# Patient Record
Sex: FEMALE | Race: WHITE | NOT HISPANIC OR LATINO | Employment: UNEMPLOYED | URBAN - NONMETROPOLITAN AREA
[De-identification: names, ages, dates, MRNs, and addresses within clinical notes are randomized per-mention and may not be internally consistent; named-entity substitution may affect disease eponyms.]

---

## 2020-11-27 ENCOUNTER — LAB REQUISITION (OUTPATIENT)
Dept: LAB | Facility: HOSPITAL | Age: 75
End: 2020-11-27

## 2020-11-27 DIAGNOSIS — Z00.00 ENCOUNTER FOR GENERAL ADULT MEDICAL EXAMINATION WITHOUT ABNORMAL FINDINGS: ICD-10-CM

## 2020-11-27 LAB — SARS-COV-2 RNA PNL SPEC NAA+PROBE: DETECTED

## 2020-11-27 PROCEDURE — 87635 SARS-COV-2 COVID-19 AMP PRB: CPT | Performed by: NURSE PRACTITIONER

## 2021-07-01 ENCOUNTER — TELEPHONE (OUTPATIENT)
Dept: NEUROSURGERY | Age: 76
End: 2021-07-01

## 2021-07-01 NOTE — TELEPHONE ENCOUNTER
Called patient to schedule new patient appt. Pt confirmed day and time of appt. New pt paperwork mailed to address on file. Pt confirmed address. Hemostasis: Electrocaut[employer]

## 2021-07-19 ENCOUNTER — OFFICE VISIT (OUTPATIENT)
Dept: NEUROSURGERY | Age: 76
End: 2021-07-19
Payer: MEDICARE

## 2021-07-19 VITALS
HEART RATE: 102 BPM | SYSTOLIC BLOOD PRESSURE: 130 MMHG | DIASTOLIC BLOOD PRESSURE: 82 MMHG | OXYGEN SATURATION: 97 % | TEMPERATURE: 96.9 F | WEIGHT: 166 LBS | HEIGHT: 68 IN | BODY MASS INDEX: 25.16 KG/M2

## 2021-07-19 DIAGNOSIS — R25.1 TREMOR: Primary | ICD-10-CM

## 2021-07-19 DIAGNOSIS — R25.1 TREMOR: ICD-10-CM

## 2021-07-19 LAB — TSH REFLEX FT4: 9.39 UIU/ML (ref 0.35–5.5)

## 2021-07-19 PROCEDURE — G8400 PT W/DXA NO RESULTS DOC: HCPCS | Performed by: NURSE PRACTITIONER

## 2021-07-19 PROCEDURE — 99204 OFFICE O/P NEW MOD 45 MIN: CPT | Performed by: NURSE PRACTITIONER

## 2021-07-19 PROCEDURE — 1090F PRES/ABSN URINE INCON ASSESS: CPT | Performed by: NURSE PRACTITIONER

## 2021-07-19 PROCEDURE — G8427 DOCREV CUR MEDS BY ELIG CLIN: HCPCS | Performed by: NURSE PRACTITIONER

## 2021-07-19 PROCEDURE — 1036F TOBACCO NON-USER: CPT | Performed by: NURSE PRACTITIONER

## 2021-07-19 PROCEDURE — 4040F PNEUMOC VAC/ADMIN/RCVD: CPT | Performed by: NURSE PRACTITIONER

## 2021-07-19 PROCEDURE — 3017F COLORECTAL CA SCREEN DOC REV: CPT | Performed by: NURSE PRACTITIONER

## 2021-07-19 PROCEDURE — G8417 CALC BMI ABV UP PARAM F/U: HCPCS | Performed by: NURSE PRACTITIONER

## 2021-07-19 PROCEDURE — 1123F ACP DISCUSS/DSCN MKR DOCD: CPT | Performed by: NURSE PRACTITIONER

## 2021-07-19 RX ORDER — POTASSIUM CHLORIDE 20 MEQ/1
20 TABLET, EXTENDED RELEASE ORAL EVERY MORNING
COMMUNITY
Start: 2021-06-30

## 2021-07-19 RX ORDER — LEVOTHYROXINE SODIUM 88 UG/1
88 TABLET ORAL
COMMUNITY

## 2021-07-19 RX ORDER — ALBUTEROL SULFATE 90 UG/1
AEROSOL, METERED RESPIRATORY (INHALATION)
COMMUNITY
Start: 2021-06-30

## 2021-07-19 RX ORDER — HYDROCODONE BITARTRATE AND ACETAMINOPHEN 7.5; 325 MG/1; MG/1
TABLET ORAL
COMMUNITY

## 2021-07-19 RX ORDER — ZOLPIDEM TARTRATE 10 MG/1
10 TABLET ORAL NIGHTLY
COMMUNITY
Start: 2021-06-30

## 2021-07-19 RX ORDER — DOXEPIN HYDROCHLORIDE 10 MG/1
10 CAPSULE ORAL DAILY
COMMUNITY
Start: 2021-06-30

## 2021-07-19 RX ORDER — PRIMIDONE 50 MG/1
50 TABLET ORAL NIGHTLY
Qty: 30 TABLET | Refills: 5 | Status: SHIPPED | OUTPATIENT
Start: 2021-07-19

## 2021-07-19 RX ORDER — IPRATROPIUM BROMIDE AND ALBUTEROL SULFATE 2.5; .5 MG/3ML; MG/3ML
SOLUTION RESPIRATORY (INHALATION)
COMMUNITY

## 2021-07-19 RX ORDER — DULOXETIN HYDROCHLORIDE 60 MG/1
60 CAPSULE, DELAYED RELEASE ORAL EVERY MORNING
COMMUNITY

## 2021-07-19 RX ORDER — FLUTICASONE PROPIONATE 110 UG/1
AEROSOL, METERED RESPIRATORY (INHALATION)
COMMUNITY
Start: 2021-06-30

## 2021-07-19 NOTE — PROGRESS NOTES
REVIEW OF SYSTEMS    Constitutional: []Fever []Sweats []Chills [] Recent Injury [x] Denies all unless marked  HEENT:[x]Headache  [] Head Injury [] Hearing Loss  [] Sore Throat  [] Ear Ache [x] Denies all unless marked  Spine:  [] Neck pain  [] Back pain  [] Sciaticia  [x] Denies all unless marked  Cardiovascular:[]Heart Disease []Palpitations [] Chest Pain   [x] Denies all unless marked  Pulmonary: [x]Shortness of Breath []Cough   [x] Denies all unless marked  Psychiatric/Behavioral:[] Depression [] Anxiety [x] Denies all unless marked  Gastrointestinal: []Nausea  []Vomiting  []Abdominal Pain  []Constipation  []Diarrhea  [x] Denies all unless marked  Genitourinary:   [] Frequency  [] Urgency  [] Dysuria [] Incontinence  [x] Denies all unless marked  Extremities: []Pain  []Swelling  [x] Denies all unless marked  Musculoskeletal: [] Myalgias  [] Joint Pain  [] Arthritis [] Muscle Cramps [] Muscle Twitches  [x] Denies all unless marked  Sleep: []Insomnia[]Snoring []Restless Legs  []Sleep Apnea  []Daytime Sleepiness  [x] Denies all unless marked  Skin:[] Rash [] Color Change [x] Denies all unless marked   Neurological:[]Visual Disturbance [] Memory Loss []Loss of Balance []Slurred Speech []Weakness []Seizures  [] Dizziness [x] Denies all unless marked

## 2021-07-19 NOTE — PROGRESS NOTES
[facility] Neurology Office Note      Patient:   Mendel Palin  MR#:    <[id number]>  Account Number:                         YOB: 1945  Date of Evaluation:  2021  Time of Note:                          3:49 PM  Primary/Referring Physician:  Connie Moran MD   Consulting Physician:  Peggy Perez, DNP, APRN    NEW PATIENT CONSULTATION    Chief Complaint   Patient presents with    New Patient     c/o shaking in hands mostly but has been noted in legs at times.  is affected     Tremors     HISTORY OF PRESENT ILLNESS    Mendel Palin is a 77y.o. year old female here for evaluation of tremors. Has noted tremors beginning over the past few months now. She notes bilateral hand tremor, worse over the right hand. Tremors worsen with intention, posture, fine motor tasks. Anxiety worsens the tremor. No clear resting tremor. Denies chin/lip tremor. Denies vocal tremor. Denies head titubation. She has noted gait changes, described more as imbalanced, using a walker. No clear shuffling. No clear rigidity or bradykinesia. Feels like tremor is interfering with ADLs at times. No tremor inducing medications. No stroke history. No clear family history of tremor, father  young. She does have COPD, on oxygen nightly and prn during the day.      Past Medical History:   Diagnosis Date    Breast cancer ([address]     COPD (chronic obstructive pulmonary disease) [facility] [address]        Past Surgical History:   Procedure Laterality Date    COLONOSCOPY      MASTECTOMY      L breast       Family History   Problem Relation Age of Onset    Ovarian Cancer Mother     Lung Cancer Father     Brain Cancer Father     Heart Attack Brother     Brain Cancer Son     Mental Retardation Son        Social History     Socioeconomic History    Marital status: Unknown     Spouse name: Not on file    Number of children: Not on file    Years of education: Not on file    Highest education level: Not on file   Occupational History    Not on file   Tobacco Use    Smoking status: Former Smoker     Types: Cigarettes     Quit date: 2012     Years since quittin.8    Smokeless tobacco: Never Used   Vaping Use    Vaping Use: Never used   Substance and Sexual Activity    Alcohol use: Not Currently    Drug use: Not Currently    Sexual activity: Not on file   Other Topics Concern    Not on file   Social History Narrative    Not on file     Social Determinants of Health     Financial Resource Strain:     Difficulty of Paying Living Expenses:    Food Insecurity:     Worried About Running Out of Food in the Last Year:     [address] in the Last Year:    Transportation Needs:     Lack of Transportation (Medical):  Lack of Transportation (Non-Medical):    Physical Activity:     Days of Exercise per Week:     Minutes of Exercise per Session:    Stress:     Feeling of Stress :    Social Connections:     Frequency of Communication with Friends and Family:     Frequency of Social Gatherings with Friends and Family:     Attends Denominational Services:     Active Member of Clubs or Organizations:     Attends Club or Organization Meetings:     Marital Status:    Intimate Partner Violence:     Fear of Current or Ex-Partner:     Emotionally Abused:     Physically Abused:     Sexually Abused:        Current Outpatient Medications   Medication Sig Dispense Refill    albuterol sulfate  (90 Base) MCG/ACT inhaler INHALE 2 PUFF(S) EVERY 4 HOURS BY INHALATION ROUTE.      doxepin (SINEQUAN) 10 MG capsule Take 10 mg by mouth daily      DULoxetine (CYMBALTA) 60 MG extended release capsule Take 60 mg by mouth every morning      fluticasone (FLOVENT HFA) 110 MCG/ACT inhaler Inhale 1 puff twice a day by inhalation route.       HYDROcodone-acetaminophen (NORCO) 7.5-325 MG per tablet TAKE 1 TABLET BY MOUTH EVERY SIX HOURS      ipratropium-albuterol (DUONEB) 0.5-2.5 (3) MG/3ML SOLN nebulizer solution INHALE 1 VIAL (3ML) PER NEBULIZER FOUR TIMES DAILY      levothyroxine (SYNTHROID) 88 MCG tablet Take 88 mcg by mouth Once in dialysis      potassium chloride (KLOR-CON M) 20 MEQ extended release tablet Take 20 mEq by mouth every morning      Pseudoephedrine-Naproxen Na (ALEVE-D SINUS & COLD PO) Take 81 mg by mouth daily      zolpidem (AMBIEN) 10 MG tablet Take 10 mg by mouth nightly.  primidone (MYSOLINE) 50 MG tablet Take 1 tablet by mouth nightly 30 tablet 5     No current facility-administered medications for this visit. Allergies   Allergen Reactions    Latex Other (See Comments)    Cephalexin Other (See Comments)    Citalopram Nausea Only    Sulfamethoxazole-Trimethoprim Other (See Comments)     REVIEW OF SYSTEMS  Constitutional: []? Fever []? Sweats []? Chills []? Recent Injury [x]? Denies all unless marked  HEENT:[x]? Headache  []? Head Injury []? Hearing Loss  []? Sore Throat  []? Ear Ache [x]? Denies all unless marked  Spine:  []? Neck pain  []? Back pain  []? Sciaticia  [x]? Denies all unless marked  Cardiovascular:[]? Heart Disease []? Palpitations []? Chest Pain   [x]? Denies all unless marked  Pulmonary: [x]? Shortness of Breath []? Cough   [x]? Denies all unless marked  Psychiatric/Behavioral:[]? Depression []? Anxiety [x]? Denies all unless marked  Gastrointestinal: []? Nausea  []? Vomiting  []? Abdominal Pain  []? Constipation  []? Diarrhea  [x]? Denies all unless marked  Genitourinary:   []? Frequency  []? Urgency  []? Dysuria []? Incontinence  [x]? Denies all unless marked  Extremities: []? Pain  []? Swelling  [x]? Denies all unless marked  Musculoskeletal: []? Myalgias  []? Joint Pain  []? Arthritis []? Muscle Cramps []? Muscle Twitches  [x]? Denies all unless marked  Sleep: []? Insomnia[]? Snoring []? Restless Legs  []? Sleep Apnea  []? Daytime Sleepiness  [x]? Denies all unless marked  Skin:[]? Rash []? Color Change [x]? Denies all unless marked   Neurological:[]? Visual Disturbance []? Memory Loss []? Loss of Balance []?Slurred Speech []? Weakness []? Seizures  []? Dizziness [x]? Denies all unless marked    The MA has completed the ROS with the patient. I have reviewed it in its' entirety with the patient and agree with the documentation. PHYSICAL EXAM  /82   Pulse 102   Temp 96.9 °F (36.1 °C)   Ht 5' 8\" (1.727 m)   Wt 166 lb (75.3 kg)   SpO2 97%   Breastfeeding No   BMI 25.24 kg/m²       Constitutional  No acute distress    HEENT- Conjunctiva normal.  No scars, masses, or lesions over external nose or ears, no neck masses noted, no jugular vein distension, no bruit  Cardiac- Regular rate and rhythm  Pulmonary- Good expansion, normal effort without use of accessory muscles  Musculoskeletal  No significant wasting of muscles noted, no bony deformities  Extremities - No clubbing, cyanosis or edema  Skin  Warm, dry, and intact. No rash, erythema, or pallor  Psychiatric  Mood, affect, and behavior appear normal      NEUROLOGICAL EXAM     Mental status   [x] Awake, alert, oriented   [x]Affect attention and concentration appear appropriate  [x]Recent and remote memory appears unremarkable  [x]Speech normal without dysarthria or aphasia, comprehension and repetition intact.    COMMENTS:    Cranial Nerves [x]No VF deficit to confrontation,  no papilledema on fundoscopic exam.  [x]PERRLA, EOMI, no nystagmus, conjugate eye movements, no ptosis  [x]Face symmetric  [x]Facial sensation intact  [x]Tongue midline no atrophy or fasciculations present  [x]Palate midline, hearing to finger rub normal bilaterally  [x]Shoulder shrug and SCM testing normal bilaterally  COMMENTS:   Motor   [x]5/5 strength x 4 extremities  [x]Normal bulk and tone  []No tremor present  []No rigidity or bradykinesia noted  COMMENTS: bilateral intention/postural tremor; questionable rigidity over the left    Sensory  [x]Sensation intact to light touch, pin prick, vibration, and proprioception BLE  [x]Sensation intact to light touch, pin prick, vibration, and proprioception BUE  COMMENTS:   Coordination [x]FTN normal bilaterally   [x]HTS normal bilaterally  [x]ABEBA normal bilaterally. COMMENTS:   Reflexes  [x]Symmetric and non-pathological  [x]Toes down going bilaterally  [x]No clonus present  COMMENTS:   Gait                  []Normal steady gait    []Ataxic    []Spastic     []Magnetic     []Shuffling  COMMENTS: cautious, with walker        LABS RECORD AND IMAGING REVIEW (As below and per HPI)    No results found for: MAATTBFY17  No results found for: WBC, HGB, HCT, MCV, PLT  No results found for: NA, K, CL, CO2, BUN, CREATININE, GLUCOSE, CALCIUM, PROT, LABALBU, BILITOT, ALKPHOS, AST, ALT, LABGLOM, GFRAA, AGRATIO, GLOB  No results found for: CHOL, TRIG, HDL, LDLCALC  No results found for: TSH, T4FREE  No results found for: CRP, SEDRATE     Reviewed referral records     ASSESSMENT:    Susan Sevilla is a 77y.o. year old female here for evaluation of tremor. Exam is most consistent with essential tremor today although she does have subtle rigidity over the left arm. However no other clear parkinsonisms on exam. Discussed MRI brain but patient hesitant to proceed with this, felt reasonable at this time. Will plan to add Primidone today, titrate as needed. Propranolol contraindicated give underlying COPD history. ICD-10-CM    1. Tremor  R25.1 TSH WITH REFLEX TO FT4       PLAN:  1. TSH/T4 today   2. Primidone 50mg nightly. Discussed side effects with patient. Discussed limiting caffeine with essential tremor. 3. Hold off on imaging for now. 4. Return in about 3 months (around 10/19/2021) for follow up, sooner if worsening.     Yelitza Turpin DNP, APRN

## 2021-07-21 ENCOUNTER — TELEPHONE (OUTPATIENT)
Dept: NEUROSURGERY | Age: 76
End: 2021-07-21

## 2021-07-21 NOTE — TELEPHONE ENCOUNTER
Called all numbers noted in chart. All numbers removed from demographics due to being the wrong number. Unable to reach patient at this time.

## 2021-07-21 NOTE — TELEPHONE ENCOUNTER
----- Message from CARA Diaz sent at 7/19/2021  4:02 PM CDT -----  Thyroid testing abnormal, f/u with PCP regarding this. Fax labs to PCP, T4 is still pending.

## 2021-09-07 ENCOUNTER — TELEPHONE (OUTPATIENT)
Dept: NEUROSURGERY | Age: 76
End: 2021-09-07

## 2021-09-07 NOTE — TELEPHONE ENCOUNTER
Called patient explained we saw her for a tremor and there no mention as to why she would need a leg massager explained she would need to follow up with pcp for this.

## 2021-09-07 NOTE — TELEPHONE ENCOUNTER
Lorenzo Cosby requests that  Nurse  return their call. The best time to reach her is Anytime. [facility] or phone [facility] . Thank you.

## 2021-09-27 NOTE — TELEPHONE ENCOUNTER
Received call from insurance spoke with [place]. He is requesting signed orders for leg massager. I explained we have spoke with the pt and informed her we see her for tremors so there would be no reason we would prescribe this that she would need to speak with PCP for orders. Pt was aware. Insurance voiced understanding.

## 2022-04-14 ENCOUNTER — HOSPITAL ENCOUNTER (OUTPATIENT)
Dept: GENERAL RADIOLOGY | Facility: HOSPITAL | Age: 77
Discharge: HOME | End: 2022-04-14
Admitting: INTERNAL MEDICINE

## 2022-04-14 ENCOUNTER — OFFICE VISIT (OUTPATIENT)
Dept: PULMONOLOGY | Facility: CLINIC | Age: 77
End: 2022-04-14

## 2022-04-14 VITALS
HEIGHT: 68 IN | BODY MASS INDEX: 25.76 KG/M2 | OXYGEN SATURATION: 98 % | WEIGHT: 170 LBS | DIASTOLIC BLOOD PRESSURE: 82 MMHG | HEART RATE: 100 BPM | SYSTOLIC BLOOD PRESSURE: 136 MMHG

## 2022-04-14 DIAGNOSIS — J96.11 CHRONIC RESPIRATORY FAILURE WITH HYPOXIA: ICD-10-CM

## 2022-04-14 DIAGNOSIS — Z86.16 HISTORY OF 2019 NOVEL CORONAVIRUS DISEASE (COVID-19): ICD-10-CM

## 2022-04-14 DIAGNOSIS — R05.9 COUGH: ICD-10-CM

## 2022-04-14 DIAGNOSIS — Z86.16 HISTORY OF 2019 NOVEL CORONAVIRUS DISEASE (COVID-19): Primary | ICD-10-CM

## 2022-04-14 PROCEDURE — 94664 DEMO&/EVAL PT USE INHALER: CPT | Performed by: INTERNAL MEDICINE

## 2022-04-14 PROCEDURE — 71046 X-RAY EXAM CHEST 2 VIEWS: CPT

## 2022-04-14 PROCEDURE — 99204 OFFICE O/P NEW MOD 45 MIN: CPT | Performed by: INTERNAL MEDICINE

## 2022-04-14 RX ORDER — FLUTICASONE PROPIONATE 50 MCG
SPRAY, SUSPENSION (ML) NASAL
COMMUNITY

## 2022-04-14 RX ORDER — DEXAMETHASONE 4 MG/1
TABLET ORAL EVERY 12 HOURS SCHEDULED
COMMUNITY
End: 2022-05-24

## 2022-04-14 RX ORDER — POTASSIUM CHLORIDE 20 MEQ/1
TABLET, EXTENDED RELEASE ORAL
COMMUNITY

## 2022-04-14 RX ORDER — IPRATROPIUM BROMIDE AND ALBUTEROL SULFATE 2.5; .5 MG/3ML; MG/3ML
SOLUTION RESPIRATORY (INHALATION)
COMMUNITY

## 2022-04-14 RX ORDER — ZOLPIDEM TARTRATE 10 MG/1
TABLET ORAL
COMMUNITY
End: 2022-12-13

## 2022-04-14 RX ORDER — ALBUTEROL SULFATE 90 UG/1
AEROSOL, METERED RESPIRATORY (INHALATION)
COMMUNITY

## 2022-04-14 RX ORDER — HYDROCODONE BITARTRATE AND ACETAMINOPHEN 7.5; 325 MG/1; MG/1
TABLET ORAL
COMMUNITY

## 2022-04-14 RX ORDER — LEVOTHYROXINE SODIUM 88 UG/1
TABLET ORAL
COMMUNITY

## 2022-04-14 NOTE — PROGRESS NOTES
Background:  pt w hx covid 2/2021, chronic cough   Chief Complaint  COPD    Subjective    History of Present Illness       Gisel Combs presents to [facility].  I am asked to see her for cough and concerns for copd.  She reports copd for several years.  She uses oxygen around the clock.  She had Covid-like problem without symptoms, over a year ago and prior history of breast cancer.  More recently she has had tremors.  Records from [facility] are reviewed indicating tx with primidone for essential tremor.  She uses oxygen at night.  Pt reports moderate intermittent dyspnea on exertion in chest associated with wheeze and alleviated by albuterol inhaler and duoneb nebs and oxygen.     [facility] history review:  Past Medical History:   Diagnosis Date   • Breast cancer (HCC)   • COPD (chronic obstructive pulmonary disease) (HCC)     Past Surgical History:   Procedure Laterality Date   • COLONOSCOPY   • MASTECTOMY 2003   L breast     Family History   Problem Relation Age of Onset   • Ovarian Cancer Mother   • Lung Cancer Father   • Brain Cancer Father   • Heart Attack Brother   • Brain Tumor  Son        has a past medical history of COPD (chronic obstructive pulmonary disease) (HCC), Hypothyroidism, Pulmonary embolism (HCC), and Sleep apnea, obstructive.   has a past surgical history that includes Hysterectomy and Vaginal prolapse repair.  family history includes Cancer in her child, father, and mother.   reports that she quit smoking about 19 years ago. She has a 20.00 pack-year smoking history. She has never used smokeless tobacco. She reports previous alcohol use. She reports previous drug use.  Allergies   Allergen Reactions   • Adhesive Tape Other (See Comments)   • Cephalexin Other (See Comments)   • Citalopram Nausea Only   • Sulfamethoxazole-Trimethoprim Other (See Comments)   • Latex Rash       Objective     Vital Signs:   /82   Pulse 100   Ht  "172.7 cm (68\")   Wt 77.1 kg (170 lb)   SpO2 98% Comment: 4L  BMI 25.85 kg/m²   Physical Exam  Constitutional:       General: She is not in acute distress.     Appearance: She is well-developed. She is not ill-appearing or toxic-appearing.   HENT:      Head: Atraumatic.   Eyes:      General: No scleral icterus.     Conjunctiva/sclera: Conjunctivae normal.   Cardiovascular:      Rate and Rhythm: Normal rate and regular rhythm.      Heart sounds: S1 normal and S2 normal.   Pulmonary:      Effort: Pulmonary effort is normal.      Breath sounds: Normal breath sounds. No wheezing.   Abdominal:      General: There is no distension.   Musculoskeletal:         General: No deformity.      Cervical back: Neck supple.   Skin:     Coloration: Skin is not pale.      Findings: No rash.   Neurological:      Mental Status: She is alert.        Result Review  Data Reviewed:{ Labs  Result Review  Imaging  Med Tab  Media :23}     X-RAY CHEST PA AND LATERAL (07/14/2014 15:36)  There is evidence of previous left mastectomy and left axillary lymph  node dissection. There is a calcified nodule in the left upper lobe. The  lungs are mildly hyperinflated. No focal infiltrate is seen. Heart size  is normal. There is mild ectasia of the thoracic aorta. The bony thorax  is unremarkable.   My interpretation of radiograph: post surgical changes from breast surgery on left, calc granuloma, mild diaphragm flattening              Assessment and Plan {CC Problem List  Visit Diagnosis  ROS  Review (Popup)  Health Maintenance  Quality  BestPractice  Medications  SmartSets  SnapShot Encounters  Media :23}   Diagnoses and all orders for this visit:    1. History of 2019 novel coronavirus disease (COVID-19) (Primary)  -     XR Chest 2 View; Future    2. Cough    3. Chronic respiratory failure with hypoxia (HCC)  -     Alpha - 1 - Antitrypsin; Future    will check PFT  will check cxr  Will check aat screen  We demonstrated proper " technique for ellipta device use  Continue oxygen  Continue albuterol and nebs prn    Follow Up {Instructions Charge Capture  Follow-up Communications :23}   Return in about 4 weeks (around 5/12/2022) for full PFT.  Patient was given instructions and counseling regarding her condition or for health maintenance advice. Please see specific information pulled into the AVS if appropriate.    Electronically signed by Matthew Mueller MD, 4/14/2022, 15:08 CDT

## 2022-04-15 ENCOUNTER — TELEPHONE (OUTPATIENT)
Dept: PULMONOLOGY | Facility: CLINIC | Age: 77
End: 2022-04-15

## 2022-04-15 NOTE — PROGRESS NOTES
Please call the patient regarding her abnormal result.  No acute abnormalities.  Compression fractures are there, new since previous xray in 2014

## 2022-04-15 NOTE — TELEPHONE ENCOUNTER
She is wanting to know if she can take her nebulizer treatments while trialing the breztri and trelegy.   She says she has the ipratropium and albuterol.

## 2022-05-23 NOTE — PROGRESS NOTES
"Background:  pt w hx ?covid 2/2021, chronic cough.  AAT MM   Chief Complaint  hx covid-19 and Shortness of Breath    Subjective    History of Present Illness       Gisel Combs presents to [facility].  She is feeling fatigued today.  She had a busy day yesterday.  She has back fractures in her back giving her a lot of pain.  She had a somewhat dubious dx of Covid last year but she did not feel she had it.  No fever no increased sputum.  She has had some days of just staying in bed.  I gave her samples of breztri and trelegy, and breztri helped significantly and trelegy helped somewhat.     Tobacco Use: Medium Risk   • Smoking Tobacco Use: Former Smoker   • Smokeless Tobacco Use: Never Used      Objective     Vital Signs:   /58   Pulse 91   Ht 162.6 cm (64\")   Wt 76 kg (167 lb 9.6 oz)   SpO2 90% Comment: dropped to 71 56 84 put on 4 lt cont up to97  BMI 28.77 kg/m²   Physical Exam  Constitutional:       General: She is not in acute distress.     Appearance: She is well-developed. She is not ill-appearing or toxic-appearing.   HENT:      Head: Atraumatic.   Eyes:      General: No scleral icterus.     Conjunctiva/sclera: Conjunctivae normal.   Cardiovascular:      Rate and Rhythm: Normal rate and regular rhythm.      Heart sounds: S1 normal and S2 normal.   Pulmonary:      Effort: Pulmonary effort is normal.      Breath sounds: Normal breath sounds.   Abdominal:      General: There is no distension.   Musculoskeletal:         General: No deformity.      Cervical back: Neck supple.   Skin:     Coloration: Skin is not pale.      Findings: No rash.   Neurological:      Mental Status: She is alert.        Result Review  Data Reviewed:{ Labs  Result Review  Imaging  Media :23}       PFT Values        Some values may be hidden. Unless noted otherwise, only the newest values recorded on each date are displayed.         Old Values PFT Results 5/24/22   No " data to display.      Pre Drug PFT Results 5/24/22   FVC 65   FEV1 53   FEF 25-75% 33   FEV1/FVC 63      Post Drug PFT Results 5/24/22   No data to display.      Other Tests PFT Results 5/24/22   TLC 89      DLCO 64   D/VAsb 86                      Assessment and Plan  {CC Problem List  Visit Diagnosis  ROS  Review (Popup)  Health Maintenance  Quality  BestPractice  Medications  SmartSets  SnapShot Encounters  Media :23}   Diagnoses and all orders for this visit:    1. Shortness of breath (Primary)  -     Pulmonary Function Test    2. Encounter for preoperative screening laboratory testing for COVID-19 virus  -     COVID PRE-OP / PRE-PROCEDURE SCREENING ORDER (NO ISOLATION) - Swab, Nasal Cavity; Future    3. Cough    4. Chronic respiratory failure with hypoxia ([facility])  -     Fluticasone-Umeclidin-Vilant (Trelegy Ellipta) 100-62.5-25 MCG/INH inhaler; Inhale 1 puff Daily for 30 days.  Dispense: 1 each; Refill: 11    5. History of 2019 novel coronavirus disease (COVID-19)    6. Stage 2 moderate COPD by GOLD classification ([facility])    Other orders  -     SCANNED - LABS    We reviewed PFT showing moderate obstruction. It does not appear she is having any exacerbation.  Insurance formulary queried and it appears trelegy is preferred with a $10 cost.  Will rx this one.  Can go back and try to prior authorize breztri if trelegy fails.  Continue oxygen for chronic resp failure.  We rechecked prior to completion of visit and sat again >90 on o2. Cough is not worse, likely related to copd.  Follow Up {Instructions Charge Capture  Follow-up Communications :23}   Return in about 6 months (around 11/24/2022).  Patient was given instructions and counseling regarding her condition or for health maintenance advice. Please see specific information pulled into the AVS if appropriate.    Electronically signed by Matthew Mueller MD, 5/24/2022, 17:37 CDT

## 2022-05-24 ENCOUNTER — OFFICE VISIT (OUTPATIENT)
Dept: PULMONOLOGY | Facility: CLINIC | Age: 77
End: 2022-05-24

## 2022-05-24 VITALS
HEIGHT: 64 IN | BODY MASS INDEX: 28.61 KG/M2 | OXYGEN SATURATION: 90 % | WEIGHT: 167.6 LBS | SYSTOLIC BLOOD PRESSURE: 100 MMHG | HEART RATE: 91 BPM | DIASTOLIC BLOOD PRESSURE: 58 MMHG

## 2022-05-24 DIAGNOSIS — Z20.822 ENCOUNTER FOR PREOPERATIVE SCREENING LABORATORY TESTING FOR COVID-19 VIRUS: ICD-10-CM

## 2022-05-24 DIAGNOSIS — Z01.812 ENCOUNTER FOR PREOPERATIVE SCREENING LABORATORY TESTING FOR COVID-19 VIRUS: ICD-10-CM

## 2022-05-24 DIAGNOSIS — J96.11 CHRONIC RESPIRATORY FAILURE WITH HYPOXIA: ICD-10-CM

## 2022-05-24 DIAGNOSIS — R06.02 SHORTNESS OF BREATH: Primary | ICD-10-CM

## 2022-05-24 DIAGNOSIS — Z86.16 HISTORY OF 2019 NOVEL CORONAVIRUS DISEASE (COVID-19): ICD-10-CM

## 2022-05-24 DIAGNOSIS — J44.9 STAGE 2 MODERATE COPD BY GOLD CLASSIFICATION: ICD-10-CM

## 2022-05-24 DIAGNOSIS — R05.9 COUGH: ICD-10-CM

## 2022-05-24 PROCEDURE — 94729 DIFFUSING CAPACITY: CPT | Performed by: INTERNAL MEDICINE

## 2022-05-24 PROCEDURE — 99214 OFFICE O/P EST MOD 30 MIN: CPT | Performed by: INTERNAL MEDICINE

## 2022-05-24 PROCEDURE — 94010 BREATHING CAPACITY TEST: CPT | Performed by: INTERNAL MEDICINE

## 2022-05-24 PROCEDURE — 94727 GAS DIL/WSHOT DETER LNG VOL: CPT | Performed by: INTERNAL MEDICINE

## 2022-05-24 RX ORDER — ACETAMINOPHEN AND CODEINE PHOSPHATE 300; 60 MG/1; MG/1
TABLET ORAL
COMMUNITY

## 2022-05-24 NOTE — PROCEDURES
Pulmonary Function Test  Performed by: Laurie Jolley, RRT  Authorized by: Matthew Mueller MD      Pre Drug % Predicted    FVC: 65%   FEV1: 53%   FEF 25-75%: 33%   FEV1/FVC: 63%   T%   RV: 116%   DLCO: 64%   D/VAsb: 86%    Interpretation   Spirometry   Spirometry shows moderate obstruction. There is reduced midflow suggesting small airway/airflow obstruction.   Review of FVL curve   Patient's effort is normal.   Lung Volume Measurements  Measurements show normal results.   Diffusion Capacity  The patient's diffusion capacity is normal.  Diffusion capacity is normal when corrected for alveolar volume.

## 2022-12-13 ENCOUNTER — OFFICE VISIT (OUTPATIENT)
Dept: PULMONOLOGY | Facility: CLINIC | Age: 77
End: 2022-12-13

## 2022-12-13 VITALS
HEART RATE: 116 BPM | SYSTOLIC BLOOD PRESSURE: 142 MMHG | WEIGHT: 175 LBS | DIASTOLIC BLOOD PRESSURE: 96 MMHG | HEIGHT: 64 IN | BODY MASS INDEX: 29.88 KG/M2 | OXYGEN SATURATION: 96 %

## 2022-12-13 DIAGNOSIS — J44.9 STAGE 2 MODERATE COPD BY GOLD CLASSIFICATION: Primary | ICD-10-CM

## 2022-12-13 DIAGNOSIS — Z86.16 HISTORY OF 2019 NOVEL CORONAVIRUS DISEASE (COVID-19): ICD-10-CM

## 2022-12-13 DIAGNOSIS — R05.3 CHRONIC COUGH: ICD-10-CM

## 2022-12-13 DIAGNOSIS — J96.11 CHRONIC RESPIRATORY FAILURE WITH HYPOXIA: ICD-10-CM

## 2022-12-13 PROCEDURE — 99214 OFFICE O/P EST MOD 30 MIN: CPT | Performed by: INTERNAL MEDICINE

## 2022-12-13 RX ORDER — SERTRALINE HYDROCHLORIDE 100 MG/1
100 TABLET, FILM COATED ORAL DAILY
COMMUNITY

## 2022-12-13 RX ORDER — TEMAZEPAM 30 MG/1
CAPSULE ORAL NIGHTLY
COMMUNITY
Start: 2022-12-12

## 2022-12-13 NOTE — PROGRESS NOTES
"Background:  pt w chronic cough.  AAT MM   Chief Complaint  Shortness of Breath    Subjective    History of Present Illness       Gisel Combs presents to [facility] PULMONARY & CRITICAL CARE MEDICINE.  History of Present Illness  We had tried trelegy at the last visit.  She continues on oxygen concentrator.  She has not had any resp crises or panic attacks lately.  She misplaced the trelegy.  She is trying it again.  She has been using the nebulizer 4 times every day.      Tobacco Use: Medium Risk   • Smoking Tobacco Use: Former   • Smokeless Tobacco Use: Never   • Passive Exposure: Not on file      Current Outpatient Medications   Medication Instructions   • acetaminophen-codeine (TYLENOL with CODEINE #4) 300-60 MG per tablet acetaminophen 300 mg-codeine 60 mg tablet   • albuterol sulfate  (90 Base) MCG/ACT inhaler albuterol sulfate HFA 90 mcg/actuation aerosol inhaler   • Cholecalciferol 50 MCG (2000 UT) capsule 2 capsule once a day   • fluticasone (FLONASE) 50 MCG/ACT nasal spray fluticasone propionate 50 mcg/actuation nasal spray,suspension   • HYDROcodone-acetaminophen (NORCO) 7.5-325 MG per tablet 1 tablet every 6 hours PRN   • ipratropium-albuterol (DUO-NEB) 0.5-2.5 mg/3 ml nebulizer 4 times a day   • levothyroxine (SYNTHROID, LEVOTHROID) 88 MCG tablet levothyroxine 88 mcg tablet   TAKE 1 TABLET BY MOUTH EVERY DAY   • potassium chloride (K-DUR,KLOR-CON) 20 MEQ CR tablet Once a day   • sertraline (ZOLOFT) 100 mg, Oral, Daily   • temazepam (RESTORIL) 30 MG capsule Nightly   • zolpidem (AMBIEN) 10 MG tablet At HS      Objective     Vital Signs:   /96   Pulse 116   Ht 162.6 cm (64\")   Wt 79.4 kg (175 lb)   SpO2 96% Comment: 4L  BMI 30.04 kg/m²   Physical Exam  Constitutional:       General: She is not in acute distress.     Appearance: She is well-developed. She is not ill-appearing or toxic-appearing.   HENT:      Head: Atraumatic.   Eyes:      General: No scleral " icterus.     Conjunctiva/sclera: Conjunctivae normal.   Cardiovascular:      Rate and Rhythm: Normal rate and regular rhythm.      Heart sounds: S1 normal and S2 normal.   Pulmonary:      Effort: Pulmonary effort is normal.      Breath sounds: Normal breath sounds. No wheezing.   Abdominal:      General: There is no distension.   Musculoskeletal:         General: No deformity.      Cervical back: Neck supple.   Skin:     Coloration: Skin is not pale.      Findings: No rash.   Neurological:      Mental Status: She is alert.        Result Review  Data Reviewed:{ Labs  Result Review  Imaging  Media :23}        XR Chest 2 View (04/14/2022 14:54)  1. No acute appearing infiltrate or effusion.  2. Stable bronchial wall thickening.  3. Prior left mastectomy.  4. Multiple thoracic spine compression fractures that are new compared  to the prior study. There are considered age indeterminate on this  study. Correlate clinically.     PFT Values        Some values may be hidden. Unless noted otherwise, only the newest values recorded on each date are displayed.         Old Values PFT Results 5/24/22   No data to display.      Pre Drug PFT Results 5/24/22   FVC 65   FEV1 53   FEF 25-75% 33   FEV1/FVC 63      Post Drug PFT Results 5/24/22   No data to display.      Other Tests PFT Results 5/24/22   TLC 89      DLCO 64   D/VAsb 86                      Assessment and Plan  {CC Problem List  Visit Diagnosis  ROS  Review (Popup)  Health Maintenance  Quality  BestPractice  Medications  SmartSets  SnapShot Encounters  Media :23}   Diagnoses and all orders for this visit:    1. Stage 2 moderate COPD by GOLD classification (HCC) (Primary)    2. Chronic cough    3. Chronic respiratory failure with hypoxia (HCC)    4. History of 2019 novel coronavirus disease (COVID-19)    recommend flu shot  Resume trelegy; continue if this helps  Continue neb meds.  Continue oxygen, compliant and benefiting  She is up to date on Covid  vaccine  Call me if she worsens    Follow Up {Instructions Charge Capture  Follow-up Communications :23}   Return in about 6 months (around 6/13/2023) for spirometry.  Patient was given instructions and counseling regarding her condition or for health maintenance advice. Please see specific information pulled into the AVS if appropriate.    Electronically signed by Matthew Mueller MD, 12/13/2022, 14:21 CST

## 2023-01-18 ENCOUNTER — TELEPHONE (OUTPATIENT)
Dept: NEUROLOGY | Age: 78
End: 2023-01-18

## 2023-01-18 NOTE — TELEPHONE ENCOUNTER
A lady called this afternoon from the patients PCP's office to request an appt for the patient with Dr. Mejia Perdomo or Dr. Lazaro Raphael. Patient has previously been seen by Tani Gutierrez for tremors, but is now wishing to see one of the aforementioned doctors. Please contact patient to discuss.     Thank you

## 2023-01-26 NOTE — TELEPHONE ENCOUNTER
I got the ok from Shoaib Reese and Dr. Nohemi Rossi. Tried calling patient to schedule an appointment with Dr. Nohemi Rossi, left a voicemail asking for a call back.

## 2023-10-24 ENCOUNTER — OFFICE VISIT (OUTPATIENT)
Dept: PULMONOLOGY | Facility: CLINIC | Age: 78
End: 2023-10-24
Payer: MEDICARE

## 2023-10-24 VITALS
DIASTOLIC BLOOD PRESSURE: 80 MMHG | SYSTOLIC BLOOD PRESSURE: 122 MMHG | BODY MASS INDEX: 29.53 KG/M2 | OXYGEN SATURATION: 90 % | HEART RATE: 95 BPM | WEIGHT: 173 LBS | HEIGHT: 64 IN

## 2023-10-24 DIAGNOSIS — J96.11 CHRONIC RESPIRATORY FAILURE WITH HYPOXIA: ICD-10-CM

## 2023-10-24 DIAGNOSIS — J44.9 STAGE 2 MODERATE COPD BY GOLD CLASSIFICATION: Primary | ICD-10-CM

## 2023-10-24 DIAGNOSIS — Z29.11 NEED FOR PROPHYLACTIC VACCINATION AND INOCULATION AGAINST RESPIRATORY SYNCYTIAL VIRUS (RSV): ICD-10-CM

## 2023-10-24 DIAGNOSIS — R05.3 CHRONIC COUGH: ICD-10-CM

## 2023-10-24 DIAGNOSIS — Z86.16 HISTORY OF 2019 NOVEL CORONAVIRUS DISEASE (COVID-19): ICD-10-CM

## 2023-10-24 PROCEDURE — 99214 OFFICE O/P EST MOD 30 MIN: CPT | Performed by: INTERNAL MEDICINE

## 2023-10-24 RX ORDER — ESCITALOPRAM OXALATE 10 MG/1
TABLET ORAL
COMMUNITY
Start: 2023-08-07

## 2023-10-24 NOTE — PROGRESS NOTES
Background:  pt w chronic cough.  AAT MM   Chief Complaint  No chief complaint on file.    Subjective    History of Present Illness     Gisel Combs is here for follow up with [facility]UL[facility]ITICAL CARE MEDIC[facility].  History of Present Illness  I had resumed Trelegy at the last visit close to a year ago.  She is using nebs sometimes 2 and sometimes 4 times per day.  She is out of trelegy but preferred breztri.  She got a steroid shot this am   she continues on oxygen by concentrator.  She lives in [place] She gets [facility] oxygen [facility] [place].  The machine is too heavy and only has a 1 hour battery.  She has had it for 2-3 years.  She has back problems.  She thinks an [employer] one might be better for her.  She uses 4 lpm. No recent exacerbations.  She goes to [facility] therapy twice a week.  She had a fall a few days ago after a dizzy spell.  She went to the Dr this am after feeling sick all weekend after the fall.  She reports leg swelling     Tobacco Use: Medium Risk (10/24/2023)    Patient History     Smoking Tobacco Use: Former     Smokeless Tobacco Use: Never     Passive Exposure: Not on file      Current Outpatient Medications   Medication Instructions    acetaminophen-codeine (TYLENOL with CODEINE #4) 300-60 MG per tablet acetaminophen 300 mg-codeine 60 mg tablet    albuterol sulfate  (90 Base) MCG/ACT inhaler albuterol sulfate HFA 90 mcg/actuation aerosol inhaler    Budeson-Glycopyrrol-Formoterol (BREZTRI) 160-9-4.8 MCG/ACT aerosol inhaler 2 puffs, Inhalation, 2 Times Daily    Cholecalciferol 50 MCG (2000 UT) capsule 2 capsule once a day    escitalopram (LEXAPRO) 10 MG tablet     fluticasone (FLONASE) 50 MCG/ACT nasal spray fluticasone propionate 50 mcg/actuation nasal spray,suspension    HYDROcodone-acetaminophen (NORCO) 7.5-325 MG per tablet 1 tablet every 6 hours PRN    ipratropium-albuterol (DUO-NEB) 0.5-2.5 mg/3 ml nebulizer 4 times a day    levothyroxine  "(SYNTHROID, LEVOTHROID) 88 MCG tablet levothyroxine 88 mcg tablet   TAKE 1 TABLET BY MOUTH EVERY DAY    potassium chloride (K-DUR,KLOR-CON) 20 MEQ CR tablet Once a day    sertraline (ZOLOFT) 100 mg, Oral, Daily    temazepam (RESTORIL) 30 MG capsule Nightly      Objective     Vital Signs:   /80   Pulse 95   Ht 162.6 cm (64\")   Wt 78.5 kg (173 lb)   SpO2 90% Comment: 4L  BMI 29.70 kg/m²   Physical Exam  Constitutional:       General: She is not in acute distress.     Appearance: She is well-developed. She is not ill-appearing or toxic-appearing.   HENT:      Head: Atraumatic.   Eyes:      General: No scleral icterus.     Conjunctiva/sclera: Conjunctivae normal.   Cardiovascular:      Rate and Rhythm: Normal rate and regular rhythm.      Heart sounds: S1 normal and S2 normal.   Pulmonary:      Effort: Pulmonary effort is normal.      Breath sounds: Normal breath sounds.   Abdominal:      General: There is no distension.   Musculoskeletal:         General: No deformity.      Cervical back: Neck supple.   Skin:     Coloration: Skin is not pale.      Findings: No rash.   Neurological:      Mental Status: She is alert.        Result Review  Data Reviewed:         PFT Values          5/24/2022    15:15   Pre Drug PFT Results   FVC 65   FEV1 53   FEF 25-75% 33   FEV1/FVC 63   Other Tests PFT Results   TLC 89      DLCO 64   D/VAsb 86                Assessment and Plan    Diagnoses and all orders for this visit:    1. Stage 2 moderate COPD by GOLD classification (Primary)  -     Budeson-Glycopyrrol-Formoterol (BREZTRI) 160-9-4.8 MCG/ACT aerosol inhaler; Inhale 2 puffs 2 (Two) Times a Day.  Dispense: 5.9 g; Refill: 0    2. Chronic cough    3. Chronic respiratory failure with hypoxia    4. History of 2019 novel coronavirus disease (COVID-19)    5. Need for prophylactic vaccination and inoculation against respiratory syncytial virus (RSV)  -     RSVPreF3 Vac Recomb Adjuvanted (AREXVY) 120 MCG/0.5ML reconstituted " suspension injection; Inject 0.5 mL into the appropriate muscle as directed by prescriber 1 (One) Time for 1 dose.  Dispense: 0.5 mL; Refill: 0    Will resume triple therapy for copd, chronic cough.  Uncertain whether any of this pertains to Covid with a long Covid variant  Continue oxygen.  Will contact [facility] about replacment concentrator, hopefully lighter and with longer lasting battery.  I gave her 2 samples of breztri.  Will try to keep her on breztri or trelegy, whichever one can be gotten/afforded.  Recommend and rx for rsv vaccine.    Follow Up   Return in about 4 months (around 2/24/2024).  Patient was given instructions and counseling regarding her condition or for health maintenance advice. Please see specific information pulled into the AVS if appropriate.    Electronically signed by Matthew Mueller MD, 10/25/2023, 06:04 CDT

## 2024-03-05 DIAGNOSIS — J44.9 STAGE 2 MODERATE COPD BY GOLD CLASSIFICATION: ICD-10-CM

## 2024-03-05 NOTE — TELEPHONE ENCOUNTER
Caller: Gisel Combs    Relationship: Self    Best call back number: [phone]    Requested Prescriptions:   Requested Prescriptions     Pending Prescriptions Disp Refills    Budeson-Glycopyrrol-Formoterol (BREZTRI) 160-9-4.8 MCG/ACT aerosol inhaler 5.9 g 0     Sig: Inhale 2 puffs 2 (Two) Times a Day.        Pharmacy where request should be sent:  [facility] [address] - [phone] [facility] [phone]  [phone]    Last office visit with prescribing clinician: 10/24/2023   Last telemedicine visit with prescribing clinician: Visit date not found   Next office visit with prescribing clinician: 3/25/2024     Additional details provided by patient:     Does the patient have less than a 3 day supply:  [] Yes  [] No    Would you like a call back once the refill request has been completed: [] Yes [] No    If the office needs to give you a call back, can they leave a voicemail: [] Yes [] No    Kate Wills Rep   03/05/24 08:35 CST

## 2024-03-25 ENCOUNTER — OFFICE VISIT (OUTPATIENT)
Dept: PULMONOLOGY | Facility: CLINIC | Age: 79
End: 2024-03-25
Payer: MEDICARE

## 2024-03-25 VITALS
HEART RATE: 76 BPM | DIASTOLIC BLOOD PRESSURE: 70 MMHG | SYSTOLIC BLOOD PRESSURE: 122 MMHG | OXYGEN SATURATION: 95 % | WEIGHT: 161 LBS | HEIGHT: 64 IN | BODY MASS INDEX: 27.49 KG/M2

## 2024-03-25 DIAGNOSIS — Z80.1 FAMILY HISTORY OF LUNG CANCER: ICD-10-CM

## 2024-03-25 DIAGNOSIS — Z87.891 HISTORY OF CIGARETTE SMOKING: ICD-10-CM

## 2024-03-25 DIAGNOSIS — J44.9 STAGE 2 MODERATE COPD BY GOLD CLASSIFICATION: Primary | ICD-10-CM

## 2024-03-25 DIAGNOSIS — J96.11 CHRONIC RESPIRATORY FAILURE WITH HYPOXIA: ICD-10-CM

## 2024-03-25 PROCEDURE — 99214 OFFICE O/P EST MOD 30 MIN: CPT | Performed by: INTERNAL MEDICINE

## 2024-03-25 RX ORDER — ROPINIROLE 0.5 MG/1
TABLET, FILM COATED ORAL
COMMUNITY
Start: 2024-01-30

## 2024-03-25 RX ORDER — PROPRANOLOL HYDROCHLORIDE 10 MG/1
10 TABLET ORAL 2 TIMES DAILY
COMMUNITY

## 2024-03-25 RX ORDER — ALBUTEROL SULFATE 90 UG/1
2 AEROSOL, METERED RESPIRATORY (INHALATION) EVERY 4 HOURS PRN
Qty: 20.1 G | Refills: 11 | Status: SHIPPED | OUTPATIENT
Start: 2024-03-25 | End: 2024-04-24

## 2024-03-25 RX ORDER — MECLIZINE HYDROCHLORIDE 25 MG/1
TABLET ORAL
COMMUNITY
Start: 2024-01-23

## 2024-03-25 RX ORDER — HYDROXYZINE HYDROCHLORIDE 25 MG/1
TABLET, FILM COATED ORAL
COMMUNITY
Start: 2024-01-23

## 2024-03-25 RX ORDER — ALBUTEROL SULFATE 2.5 MG/3ML
2.5 SOLUTION RESPIRATORY (INHALATION) EVERY 6 HOURS PRN
Qty: 360 EACH | Refills: 3 | Status: SHIPPED | OUTPATIENT
Start: 2024-03-25 | End: 2024-06-23

## 2024-03-25 NOTE — PROGRESS NOTES
Background:  pt w chronic cough.  AAT MM   Chief Complaint  COPD    Subjective    History of Present Illness     Gisel Combs is here for follow up with [facility] PULMONARY & CRITICAL CARE MEDICINE.  History of Present Illness  We have been treating her with triple therapy in whichever form her insurance will authorize and/or she can afford.  We had investigated for possibility of lighter oxygen concentrator.  She quit smoking in 2002, not a candidate for lung cancer screening. Her current machine only has 1 hour battery life. Her back is bad and she has compression fractures.  Machine is heavy and she has to have someone else carry the oxygen concentrator when she is out.  She thinks breztri is better than trelegy and she has been able to get that.  She has had multiple family members with cancer including lung.  She has trouble with tubing length in her home oxygen system.       Tobacco Use: Medium Risk (3/25/2024)    Patient History     Smoking Tobacco Use: Former     Smokeless Tobacco Use: Never     Passive Exposure: Not on file      Current Outpatient Medications   Medication Instructions    acetaminophen-codeine (TYLENOL with CODEINE #4) 300-60 MG per tablet acetaminophen 300 mg-codeine 60 mg tablet    albuterol (PROVENTIL) 2.5 mg, Nebulization, Every 6 Hours PRN    albuterol sulfate  (90 Base) MCG/ACT inhaler 2 puffs, Inhalation, Every 4 Hours PRN    Budeson-Glycopyrrol-Formoterol (BREZTRI) 160-9-4.8 MCG/ACT aerosol inhaler 2 puffs, Inhalation, 2 Times Daily    Cholecalciferol 50 MCG (2000 UT) capsule 2 capsule once a day    escitalopram (LEXAPRO) 10 MG tablet     fluticasone (FLONASE) 50 MCG/ACT nasal spray fluticasone propionate 50 mcg/actuation nasal spray,suspension    HYDROcodone-acetaminophen (NORCO) 7.5-325 MG per tablet 1 tablet every 6 hours PRN    hydrOXYzine (ATARAX) 25 MG tablet     levothyroxine (SYNTHROID, LEVOTHROID) 88 MCG tablet levothyroxine 88 mcg tablet   TAKE  "1 TABLET BY MOUTH EVERY DAY    meclizine (ANTIVERT) 25 MG tablet     potassium chloride (K-DUR,KLOR-CON) 20 MEQ CR tablet Once a day    propranolol (INDERAL) 10 mg, Oral, 2 Times Daily    rOPINIRole (REQUIP) 0.5 MG tablet     sertraline (ZOLOFT) 100 mg, Oral, Daily    temazepam (RESTORIL) 30 MG capsule Nightly      Objective     Vital Signs:   /70   Pulse 76   Ht 162.6 cm (64\")   Wt 73 kg (161 lb)   SpO2 95% Comment: 4L  BMI 27.64 kg/m²   Physical Exam  Constitutional:       General: She is not in acute distress.     Appearance: She is well-developed. She is not ill-appearing or toxic-appearing.      Interventions: Nasal cannula in place.   HENT:      Head: Atraumatic.   Eyes:      General: No scleral icterus.     Conjunctiva/sclera: Conjunctivae normal.   Cardiovascular:      Rate and Rhythm: Normal rate and regular rhythm.      Heart sounds: S1 normal and S2 normal.   Pulmonary:      Effort: Pulmonary effort is normal.      Breath sounds: Normal breath sounds.   Abdominal:      General: There is no distension.   Musculoskeletal:         General: No deformity.      Cervical back: Neck supple.   Skin:     Coloration: Skin is not pale.      Findings: No rash.   Neurological:      Mental Status: She is alert.   Psychiatric:         Behavior: Behavior is cooperative.        Result Review  Data Reviewed:         PFT Values          5/24/2022    15:15   Pre Drug PFT Results   FVC 65   FEV1 53   FEF 25-75% 33   FEV1/FVC 63   Other Tests PFT Results   TLC 89      DLCO 64   D/VAsb 86                Assessment and Plan    Diagnoses and all orders for this visit:    1. Stage 2 moderate COPD by GOLD classification (Primary)  -     albuterol sulfate  (90 Base) MCG/ACT inhaler; Inhale 2 puffs Every 4 (Four) Hours As Needed for Wheezing for up to 30 days.  Dispense: 20.1 g; Refill: 11  -     albuterol (PROVENTIL) (2.5 MG/3ML) 0.083% nebulizer solution; Take 2.5 mg by nebulization Every 6 (Six) Hours As " Needed for Wheezing or Shortness of Air for up to 90 days.  Dispense: 360 each; Refill: 3    2. Chronic respiratory failure with hypoxia  -     albuterol sulfate  (90 Base) MCG/ACT inhaler; Inhale 2 puffs Every 4 (Four) Hours As Needed for Wheezing for up to 30 days.  Dispense: 20.1 g; Refill: 11  -     albuterol (PROVENTIL) (2.5 MG/3ML) 0.083% nebulizer solution; Take 2.5 mg by nebulization Every 6 (Six) Hours As Needed for Wheezing or Shortness of Air for up to 90 days.  Dispense: 360 each; Refill: 3    3. History of cigarette smoking  -      CT Chest Low Dose Cancer Screening WO; Future    4. Family history of lung cancer      Recommend rsv vaccine  Try to see if [facility] can get her a lighter weight and longer acting concentrator.  Continue lt O2 TX, compliant and benefiting  Continue breztri  Continue albuterol and refill  Recommend rsv vaccine.  Discussed merits    Follow Up   Return in about 4 months (around 7/25/2024).  Patient was given instructions and counseling regarding her condition or for health maintenance advice. Please see specific information pulled into the AVS if appropriate.    Electronically signed by Matthew Mueller MD, 3/25/2024, 21:34 CDT

## 2024-03-26 ENCOUNTER — TELEPHONE (OUTPATIENT)
Dept: PULMONOLOGY | Facility: CLINIC | Age: 79
End: 2024-03-26
Payer: MEDICARE

## 2024-03-26 NOTE — TELEPHONE ENCOUNTER
[facility] is aware.  Patient's current machine is the lightest POC they have. Patient is also on 4L.  The battery life is very short when on increased oxygen.  [facility] will reach out to patient to discuss.

## 2024-03-26 NOTE — TELEPHONE ENCOUNTER
----- Message from Matthew Mueller MD sent at 3/25/2024  9:35 PM CDT -----  Can you have [facility] talk with her about options for her portable o2 concentrator.  She says hers is too heavy and also battery only lasts 1 hour.

## 2024-04-30 ENCOUNTER — HOSPITAL ENCOUNTER (OUTPATIENT)
Dept: CT IMAGING | Facility: HOSPITAL | Age: 79
Discharge: HOME | End: 2024-04-30
Admitting: INTERNAL MEDICINE
Payer: MEDICARE

## 2024-04-30 DIAGNOSIS — Z87.891 HISTORY OF CIGARETTE SMOKING: ICD-10-CM

## 2024-04-30 PROCEDURE — 71271 CT THORAX LUNG CANCER SCR C-: CPT

## 2024-07-24 NOTE — PROGRESS NOTES
Chief Complaint  COPD    Subjective    History of Present Illness {CC  Problem List  Visit Diagnosis   Encounters  Notes  Medications  Labs  Result Review Imaging  Media     Gisel Combs presents to [facility] PULMONARY & CRITICAL CARE MEDICINE for:    History of Present Illness  Ms. Combs is here for follow up and management of stage 2 copd. She continues on Breztri and does well with this. She continues on supplemental oxygen at 3 to 4 L for which she is compliant and benefiting.  Has nebulized albuterol as needed.  She was sick once since last visit.  She tells me a couple months ago she came down with flulike symptoms.  She was tested for RSV and COVID and believes these were negative.  She was treated with antibiotics and steroids.  Physician wanted her to go to the hospital but she did not want to leave her pets.  She has since recovered and is back to her baseline.  LDCT due April 2025.       Prior to Admission medications    Medication Sig Start Date End Date Taking? Authorizing Provider   acetaminophen-codeine (TYLENOL with CODEINE #4) 300-60 MG per tablet acetaminophen 300 mg-codeine 60 mg tablet    ProviderChacho MD   Budeson-Glycopyrrol-Formoterol (BREZTRI) 160-9-4.8 MCG/ACT aerosol inhaler Inhale 2 puffs 2 (Two) Times a Day. 3/5/24   Matthew Mueller MD   Cholecalciferol 50 MCG (2000 UT) capsule 2 capsule once a day    ProviderChacho MD   escitalopram (LEXAPRO) 10 MG tablet  8/7/23   Chacho Collier MD   fluticasone (FLONASE) 50 MCG/ACT nasal spray fluticasone propionate 50 mcg/actuation nasal spray,suspension    ProviderChacho MD   HYDROcodone-acetaminophen (NORCO) 7.5-325 MG per tablet 1 tablet every 6 hours PRN    Chacho Collier MD   hydrOXYzine (ATARAX) 25 MG tablet  1/23/24   Chacho Collier MD   levothyroxine (SYNTHROID, LEVOTHROID) 88 MCG tablet levothyroxine 88 mcg tablet   TAKE 1 TABLET BY MOUTH EVERY DAY    Provider  "MD Chacho   meclizine (ANTIVERT) 25 MG tablet  24   Chacho Collier MD   potassium chloride (K-DUR,KLOR-CON) 20 MEQ CR tablet Once a day    Chacho Collier MD   propranolol (INDERAL) 10 MG tablet Take 1 tablet by mouth 2 (Two) Times a Day.    Chacho Collier MD   rOPINIRole (REQUIP) 0.5 MG tablet  24   Chacho Collier MD   sertraline (ZOLOFT) 100 MG tablet Take 1 tablet by mouth Daily.    Chacho Collier MD   temazepam (RESTORIL) 30 MG capsule Every Night. 22   Chacho Collier MD       Social History     Socioeconomic History    Marital status:    Tobacco Use    Smoking status: Former     Current packs/day: 0.00     Average packs/day: 1 pack/day for 20.0 years (20.0 ttl pk-yrs)     Types: Cigarettes     Start date: 1982     Quit date: 2002     Years since quittin.8    Smokeless tobacco: Never   Substance and Sexual Activity    Alcohol use: Not Currently    Drug use: Not Currently    Sexual activity: Defer       Objective   Vital Signs:   /80   Pulse 66   Ht 162.6 cm (64\")   Wt 72.6 kg (160 lb) Comment: per pt  SpO2 95% Comment: 4LPD  BMI 27.46 kg/m²     Physical Exam  Constitutional:       General: She is not in acute distress.     Interventions: Nasal cannula in place.   HENT:      Head: Normocephalic.      Nose: Nose normal.      Mouth/Throat:      Mouth: Mucous membranes are moist.   Eyes:      General: No scleral icterus.  Cardiovascular:      Rate and Rhythm: Normal rate.   Pulmonary:      Effort: No respiratory distress.      Breath sounds: Decreased breath sounds present.   Abdominal:      General: There is no distension.   Musculoskeletal:      Comments: Wheelchair   Neurological:      Mental Status: She is alert and oriented to person, place, and time.   Psychiatric:         Mood and Affect: Mood normal.         Behavior: Behavior is cooperative.        Result Review :{ Labs  Result Review  Imaging  Med Tab  Media " :          Results for orders placed in visit on 22    Pulmonary Function Test    Narrative  Pulmonary Function Test  Performed by: Laurie Jolley, RRT  Authorized by: Matthew Mueller MD    Pre Drug % Predicted  FVC: 65%  FEV1: 53%  FEF 25-75%: 33%  FEV1/FVC: 63%  T%  RV: 116%  DLCO: 64%  D/VAsb: 86%    Interpretation  Spirometry  Spirometry shows moderate obstruction. There is reduced midflow suggesting small airway/airflow obstruction.  Review of FVL curve  Patient's effort is normal.  Lung Volume Measurements  Measurements show normal results.  Diffusion Capacity  The patient's diffusion capacity is normal.  Diffusion capacity is normal when corrected for alveolar volume.               CT Chest Low Dose Cancer Screening WO (2024 13:01)   My interpretation of imaging: Mild upper lobe centrilobular emphysema, left lung calcified granuloma, no suspicious lung nodules  Alpha 1- M/M      Assessment and Plan {CC Problem List  Visit Diagnosis  ROS  Review (Popup)  Health Maintenance  Quality  BestPractice  Medications  SmartSets  SnapShot Encounters  Media      Diagnoses and all orders for this visit:    1. Stage 2 moderate COPD by GOLD classification (Primary)  Comments:  Continue Breztri.  Albuterol as needed.    2. Chronic respiratory failure with hypoxia  Comments:  Continue supplemental oxygen for which she is compliant and benefiting.    3. History of cigarette smoking  Comments:  Quit smoking around .  LDCT due 2025.  We reviewed most recent CT chest together.    4. Centrilobular emphysema  Comments:  Alpha-1 normal as listed above.        Plan as above.  Office follow-up in 6 months or sooner if needed.    Debby Joseph, KENTRELL  2024  14:03 CDT    Follow Up {Instructions Charge Capture  Follow-up Communications   Return in about 6 months (around 2025) for with me or doc k .    Patient was given instructions and counseling regarding her condition or  for health maintenance advice. Please see specific information pulled into the AVS if appropriate.

## 2024-07-30 ENCOUNTER — OFFICE VISIT (OUTPATIENT)
Dept: PULMONOLOGY | Facility: CLINIC | Age: 79
End: 2024-07-30
Payer: MEDICARE

## 2024-07-30 VITALS
DIASTOLIC BLOOD PRESSURE: 80 MMHG | SYSTOLIC BLOOD PRESSURE: 132 MMHG | OXYGEN SATURATION: 95 % | BODY MASS INDEX: 27.31 KG/M2 | HEART RATE: 66 BPM | HEIGHT: 64 IN | WEIGHT: 160 LBS

## 2024-07-30 DIAGNOSIS — Z87.891 HISTORY OF CIGARETTE SMOKING: Chronic | ICD-10-CM

## 2024-07-30 DIAGNOSIS — J43.2 CENTRILOBULAR EMPHYSEMA: Chronic | ICD-10-CM

## 2024-07-30 DIAGNOSIS — J96.11 CHRONIC RESPIRATORY FAILURE WITH HYPOXIA: Chronic | ICD-10-CM

## 2024-07-30 DIAGNOSIS — J44.9 STAGE 2 MODERATE COPD BY GOLD CLASSIFICATION: Primary | Chronic | ICD-10-CM

## 2024-07-30 PROCEDURE — 99214 OFFICE O/P EST MOD 30 MIN: CPT | Performed by: NURSE PRACTITIONER

## 2024-12-20 DIAGNOSIS — J44.9 STAGE 2 MODERATE COPD BY GOLD CLASSIFICATION: ICD-10-CM

## 2024-12-20 RX ORDER — BUDESONIDE, GLYCOPYRROLATE, AND FORMOTEROL FUMARATE 160; 9; 4.8 UG/1; UG/1; UG/1
AEROSOL, METERED RESPIRATORY (INHALATION)
Qty: 10.7 G | Refills: 5 | Status: SHIPPED | OUTPATIENT
Start: 2024-12-20

## 2024-12-20 NOTE — TELEPHONE ENCOUNTER
Received a request from the pharmacy for refills on Breztri.    Rx Refill Note  Requested Prescriptions     Pending Prescriptions Disp Refills    Breztri Aerosphere 160-9-4.8 MCG/ACT aerosol inhaler [Pharmacy Med Name: BREZTRI AEROSPHERE SPHERE AEROSOL] 10.7 g 5     Sig: INHALE TWO (2) PUFFS TWO (2) (TWO) TIMES A DAY.      Last office visit with prescribing clinician: 3/25/2024   Last telemedicine visit with prescribing clinician: Visit date not found   Next office visit with prescribing clinician: 2/6/2025                         Would you like a call back once the refill request has been completed: [] Yes [] No    If the office needs to give you a call back, can they leave a voicemail: [] Yes [] No    Margaret Jimenez MA  12/20/24, 10:57 CST

## 2025-02-06 ENCOUNTER — OFFICE VISIT (OUTPATIENT)
Dept: PULMONOLOGY | Facility: CLINIC | Age: 80
End: 2025-02-06
Payer: MEDICARE

## 2025-02-06 VITALS
OXYGEN SATURATION: 92 % | SYSTOLIC BLOOD PRESSURE: 126 MMHG | BODY MASS INDEX: 25.78 KG/M2 | DIASTOLIC BLOOD PRESSURE: 80 MMHG | HEART RATE: 80 BPM | WEIGHT: 151 LBS | HEIGHT: 64 IN

## 2025-02-06 DIAGNOSIS — J43.2 CENTRILOBULAR EMPHYSEMA: ICD-10-CM

## 2025-02-06 DIAGNOSIS — J96.11 CHRONIC RESPIRATORY FAILURE WITH HYPOXIA: ICD-10-CM

## 2025-02-06 DIAGNOSIS — J44.9 STAGE 2 MODERATE COPD BY GOLD CLASSIFICATION: Primary | ICD-10-CM

## 2025-02-06 DIAGNOSIS — Z87.891 HISTORY OF CIGARETTE SMOKING: ICD-10-CM

## 2025-02-06 RX ORDER — BUDESONIDE, GLYCOPYRROLATE, AND FORMOTEROL FUMARATE 160; 9; 4.8 UG/1; UG/1; UG/1
2 AEROSOL, METERED RESPIRATORY (INHALATION) 2 TIMES DAILY
Qty: 10.7 G | Refills: 11 | Status: SHIPPED | OUTPATIENT
Start: 2025-02-06

## 2025-02-06 NOTE — PROGRESS NOTES
"Background:  pt w chronic cough.  AAT MM, stopped smoking in 2012   Chief Complaint  COPD    Subjective    History of Present Illness     Gisel Combs is here for follow up with [facility].  History of Present Illness  She had a problem with low potassium and ended up in the hospital. No exacerbations or viral illnesses.  She plans to get a RSV vaccine. She has 2 cats which don't bother her.  She had a bad spell of dyspnea a few days ago, better with nebs that day.  She is limited to a 1 hour battery with her portable concentrator.     Tobacco Use: Medium Risk (2/6/2025)    Patient History     Smoking Tobacco Use: Former     Smokeless Tobacco Use: Never     Passive Exposure: Past      Current Outpatient Medications   Medication Instructions    acetaminophen-codeine (TYLENOL with CODEINE #4) 300-60 MG per tablet acetaminophen 300 mg-codeine 60 mg tablet    Budeson-Glycopyrrol-Formoterol (Breztri Aerosphere) 160-9-4.8 MCG/ACT aerosol inhaler 2 puffs, Inhalation, 2 Times Daily    Cholecalciferol 50 MCG (2000 UT) capsule 2 capsule once a day    escitalopram (LEXAPRO) 10 MG tablet     fluticasone (FLONASE) 50 MCG/ACT nasal spray fluticasone propionate 50 mcg/actuation nasal spray,suspension    HYDROcodone-acetaminophen (NORCO) 7.5-325 MG per tablet 1 tablet every 6 hours PRN    hydrOXYzine (ATARAX) 25 MG tablet     levothyroxine (SYNTHROID, LEVOTHROID) 88 MCG tablet levothyroxine 88 mcg tablet   TAKE 1 TABLET BY MOUTH EVERY DAY    meclizine (ANTIVERT) 25 MG tablet     potassium chloride (K-DUR,KLOR-CON) 20 MEQ CR tablet Once a day    propranolol (INDERAL) 10 mg, 2 Times Daily    rOPINIRole (REQUIP) 0.5 MG tablet     sertraline (ZOLOFT) 100 mg, Daily    temazepam (RESTORIL) 30 MG capsule Nightly      Objective     Vital Signs:   /80   Pulse 80   Ht 162.6 cm (64\")   Wt 68.5 kg (151 lb)   SpO2 92% Comment: 4PD  BMI 25.92 kg/m²   Physical " Exam  Constitutional:       Interventions: Nasal cannula in place.   Pulmonary:      Effort: Pulmonary effort is normal. No respiratory distress.      Breath sounds: No wheezing.        Result Review  Data Reviewed:                      Assessment and Plan    Diagnoses and all orders for this visit:    1. Stage 2 moderate COPD by GOLD classification (Primary)  -     Budeson-Glycopyrrol-Formoterol (Breztri Aerosphere) 160-9-4.8 MCG/ACT aerosol inhaler; Inhale 2 puffs 2 (Two) Times a Day.  Dispense: 10.7 g; Refill: 11    2. Chronic respiratory failure with hypoxia    3. Centrilobular emphysema    4. History of cigarette smoking    She is not candidate for screening ct, age 79  Continue oxygen for chronic rf.  She will talk with her representative about getting a better battery  Continue nonsmoking  Refilled breztri    Follow Up   Return in about 6 months (around 8/6/2025).  Patient was given instructions and counseling regarding her condition or for health maintenance advice. Please see specific information pulled into the AVS if appropriate.    Electronically signed by Matthew Mueller MD, 2/6/2025, 14:34 CST

## 2025-04-01 DIAGNOSIS — J44.9 STAGE 2 MODERATE COPD BY GOLD CLASSIFICATION: Primary | ICD-10-CM

## 2025-04-01 RX ORDER — ALBUTEROL SULFATE 0.83 MG/ML
SOLUTION RESPIRATORY (INHALATION)
Qty: 360 ML | Refills: 3 | Status: SHIPPED | OUTPATIENT
Start: 2025-04-01

## 2025-04-01 NOTE — TELEPHONE ENCOUNTER
Pharmacy sent a request for refills on Albuterol nebulizer solution. Refill request routed to Dr. Mueller.  Rx Refill Note  Requested Prescriptions     Pending Prescriptions Disp Refills    albuterol (PROVENTIL) (2.5 MG/3ML) 0.083% nebulizer solution [Pharmacy Med Name: ALBUTEROL SULFATE 0.083% NEBULIZED SOLN] 360 mL 3     Sig: TAKE 2.5 MG BY NEBULIZATION EVERY SIX (6) (SIX) HOURS AS NEEDED FOR WHEEZING OR SHORTNESS OF AIR FOR UP TO 90 DAYS.      Last office visit with prescribing clinician: 2/6/2025   Last telemedicine visit with prescribing clinician: Visit date not found   Next office visit with prescribing clinician: 8/7/2025                         Would you like a call back once the refill request has been completed: [] Yes [] No    If the office needs to give you a call back, can they leave a voicemail: [] Yes [] No    Asim Lemos, EBEN  04/01/25, 10:09 CDT

## 2025-05-07 NOTE — PROGRESS NOTES
Patient had a negative COVID test prior to PFT.  Performed PFT. See scanned results for additional information.     Patient was mailed Plenvu instructions for Colonoscopy MAC sed  on 6/6 at [facility].

## 2025-07-09 PROCEDURE — 88305 TISSUE EXAM BY PATHOLOGIST: CPT | Performed by: PHYSICIAN ASSISTANT

## 2025-07-14 ENCOUNTER — LAB REQUISITION (OUTPATIENT)
Dept: LAB | Facility: HOSPITAL | Age: 80
End: 2025-07-14
Payer: MEDICARE

## 2025-07-14 DIAGNOSIS — D48.5 NEOPLASM OF UNCERTAIN BEHAVIOR OF SKIN: ICD-10-CM

## 2025-07-15 LAB
CYTO UR: NORMAL
LAB AP CASE REPORT: NORMAL
LAB AP CLINICAL INFORMATION: NORMAL
Lab: NORMAL
PATH REPORT.FINAL DX SPEC: NORMAL
PATH REPORT.GROSS SPEC: NORMAL

## 2025-08-07 ENCOUNTER — OFFICE VISIT (OUTPATIENT)
Dept: PULMONOLOGY | Facility: CLINIC | Age: 80
End: 2025-08-07
Payer: MEDICARE

## 2025-08-07 ENCOUNTER — HOSPITAL ENCOUNTER (OUTPATIENT)
Dept: GENERAL RADIOLOGY | Facility: HOSPITAL | Age: 80
Discharge: HOME | End: 2025-08-07
Admitting: INTERNAL MEDICINE
Payer: MEDICARE

## 2025-08-07 ENCOUNTER — RESULTS FOLLOW-UP (OUTPATIENT)
Dept: PULMONOLOGY | Facility: CLINIC | Age: 80
End: 2025-08-07

## 2025-08-07 VITALS
HEART RATE: 82 BPM | SYSTOLIC BLOOD PRESSURE: 120 MMHG | HEIGHT: 64 IN | RESPIRATION RATE: 18 BRPM | BODY MASS INDEX: 25.44 KG/M2 | WEIGHT: 149 LBS | OXYGEN SATURATION: 89 % | DIASTOLIC BLOOD PRESSURE: 70 MMHG

## 2025-08-07 DIAGNOSIS — J43.2 CENTRILOBULAR EMPHYSEMA: ICD-10-CM

## 2025-08-07 DIAGNOSIS — J96.11 CHRONIC RESPIRATORY FAILURE WITH HYPOXIA: ICD-10-CM

## 2025-08-07 DIAGNOSIS — J44.9 STAGE 2 MODERATE COPD BY GOLD CLASSIFICATION: Primary | ICD-10-CM

## 2025-08-07 DIAGNOSIS — J44.9 STAGE 2 MODERATE COPD BY GOLD CLASSIFICATION: ICD-10-CM

## 2025-08-07 PROCEDURE — 71046 X-RAY EXAM CHEST 2 VIEWS: CPT

## 2025-08-11 ENCOUNTER — TELEPHONE (OUTPATIENT)
Dept: PULMONOLOGY | Facility: CLINIC | Age: 80
End: 2025-08-11
Payer: MEDICARE